# Patient Record
Sex: MALE | Race: WHITE | NOT HISPANIC OR LATINO
[De-identification: names, ages, dates, MRNs, and addresses within clinical notes are randomized per-mention and may not be internally consistent; named-entity substitution may affect disease eponyms.]

---

## 2017-10-23 ENCOUNTER — RECORDS - HEALTHEAST (OUTPATIENT)
Dept: ADMINISTRATIVE | Facility: OTHER | Age: 72
End: 2017-10-23

## 2018-09-25 ENCOUNTER — OFFICE VISIT - HEALTHEAST (OUTPATIENT)
Dept: FAMILY MEDICINE | Facility: CLINIC | Age: 73
End: 2018-09-25

## 2018-09-25 DIAGNOSIS — G89.29 CHRONIC LEFT SHOULDER PAIN: ICD-10-CM

## 2018-09-25 DIAGNOSIS — D47.3 ESSENTIAL THROMBOCYTHEMIA (H): ICD-10-CM

## 2018-09-25 DIAGNOSIS — N40.0 BPH (BENIGN PROSTATIC HYPERPLASIA): ICD-10-CM

## 2018-09-25 DIAGNOSIS — I10 ESSENTIAL HYPERTENSION: ICD-10-CM

## 2018-09-25 DIAGNOSIS — M25.512 CHRONIC LEFT SHOULDER PAIN: ICD-10-CM

## 2018-09-25 DIAGNOSIS — Z23 NEED FOR VACCINATION: ICD-10-CM

## 2018-09-25 DIAGNOSIS — Z12.11 SCREEN FOR COLON CANCER: ICD-10-CM

## 2018-09-25 DIAGNOSIS — E78.00 ELEVATED CHOLESTEROL: ICD-10-CM

## 2018-09-25 LAB
BASOPHILS # BLD AUTO: 0 THOU/UL (ref 0–0.2)
BASOPHILS NFR BLD AUTO: 0 % (ref 0–2)
CHOLEST SERPL-MCNC: 210 MG/DL
EOSINOPHIL # BLD AUTO: 0.1 THOU/UL (ref 0–0.4)
EOSINOPHIL NFR BLD AUTO: 2 % (ref 0–6)
ERYTHROCYTE [DISTWIDTH] IN BLOOD BY AUTOMATED COUNT: 11.2 % (ref 11–14.5)
FASTING STATUS PATIENT QL REPORTED: NO
HCT VFR BLD AUTO: 37.4 % (ref 40–54)
HDLC SERPL-MCNC: 45 MG/DL
HGB BLD-MCNC: 13.1 G/DL (ref 14–18)
LDLC SERPL CALC-MCNC: 127 MG/DL
LYMPHOCYTES # BLD AUTO: 1.4 THOU/UL (ref 0.8–4.4)
LYMPHOCYTES NFR BLD AUTO: 23 % (ref 20–40)
MCH RBC QN AUTO: 40.7 PG (ref 27–34)
MCHC RBC AUTO-ENTMCNC: 35 G/DL (ref 32–36)
MCV RBC AUTO: 116 FL (ref 80–100)
MONOCYTES # BLD AUTO: 0.4 THOU/UL (ref 0–0.9)
MONOCYTES NFR BLD AUTO: 7 % (ref 2–10)
NEUTROPHILS # BLD AUTO: 4.1 THOU/UL (ref 2–7.7)
NEUTROPHILS NFR BLD AUTO: 67 % (ref 50–70)
PLATELET # BLD AUTO: 509 THOU/UL (ref 140–440)
PMV BLD AUTO: 6.4 FL (ref 7–10)
RBC # BLD AUTO: 3.21 MILL/UL (ref 4.4–6.2)
TRIGL SERPL-MCNC: 188 MG/DL
WBC: 6.1 THOU/UL (ref 4–11)

## 2018-09-25 RX ORDER — HYDROXYUREA 500 MG/1
CAPSULE ORAL
Status: SHIPPED | COMMUNITY
Start: 2018-09-17

## 2018-09-25 ASSESSMENT — MIFFLIN-ST. JEOR: SCORE: 1506.57

## 2018-10-02 ENCOUNTER — COMMUNICATION - HEALTHEAST (OUTPATIENT)
Dept: FAMILY MEDICINE | Facility: CLINIC | Age: 73
End: 2018-10-02

## 2018-10-30 ENCOUNTER — OFFICE VISIT - HEALTHEAST (OUTPATIENT)
Dept: FAMILY MEDICINE | Facility: CLINIC | Age: 73
End: 2018-10-30

## 2018-10-30 DIAGNOSIS — I10 BENIGN ESSENTIAL HYPERTENSION: ICD-10-CM

## 2018-10-30 DIAGNOSIS — N40.0 BPH (BENIGN PROSTATIC HYPERPLASIA): ICD-10-CM

## 2018-10-30 RX ORDER — TAMSULOSIN HYDROCHLORIDE 0.4 MG/1
0.4 CAPSULE ORAL
Qty: 90 CAPSULE | Refills: 12 | Status: SHIPPED | OUTPATIENT
Start: 2018-10-30

## 2018-10-30 ASSESSMENT — MIFFLIN-ST. JEOR: SCORE: 1505.92

## 2018-12-19 ENCOUNTER — AMBULATORY - HEALTHEAST (OUTPATIENT)
Dept: NURSING | Facility: CLINIC | Age: 73
End: 2018-12-19

## 2021-06-01 VITALS — BODY MASS INDEX: 25.03 KG/M2 | HEIGHT: 69 IN | WEIGHT: 169 LBS

## 2021-06-02 VITALS — WEIGHT: 169 LBS | BODY MASS INDEX: 25.03 KG/M2 | HEIGHT: 69 IN

## 2021-06-16 PROBLEM — N40.0 BPH (BENIGN PROSTATIC HYPERPLASIA): Status: ACTIVE | Noted: 2018-09-25

## 2021-06-16 PROBLEM — M25.512 CHRONIC LEFT SHOULDER PAIN: Status: ACTIVE | Noted: 2018-09-25

## 2021-06-16 PROBLEM — G89.29 CHRONIC LEFT SHOULDER PAIN: Status: ACTIVE | Noted: 2018-09-25

## 2021-06-20 NOTE — PROGRESS NOTES
MALE PREVENTATIVE EXAM    Assessment and Plan:       1. Screen for colon cancer  Already done.  He had a colonoscopy in 2016.  He was found to have adenomatous polyps, and needs repeat colonoscopy in 5 years.      2. BPH (benign prostatic hyperplasia)  We will consider medications for this if his symptoms persist.  I did let him know that medications for this could also potentially help with his blood pressure.  Will reevaluate when he comes back in 2 weeks for a blood pressure check.    3. Chronic left shoulder pain  Stretches and exercises were given today.  I did review with him that if his functioning is severely limited that I would want him to undergo an MRI as well as have second opinion regarding treatment plan.    4. Elevated cholesterol  Encouraged ongoing regular exercise.  Consider statin if ldl is increasing.    - Lipid Cascade    5. Essential thrombocythemia (H)  On hydroxyurea.  Follows up with heme yearly.    His last cbc was normal 1 year ago, only with some mild anemia.  Platelets were normal .   - HM1(CBC and Differential)  - HM1 (CBC with Diff)    6. Need for vaccination    - Pneumococcal conjugate vaccine 13-valent 6wks-17yrs; >50yrs     7.  Essential hypertension:    I did advise him to cut caffeine out to see how he does with this.  At his next visit we can consider starting a medication that will benefit both his blood pressure as well as his BPH.  This was discussed with the patient today.  He will follow-up in 2 weeks for recheck of his blood pressure.  I have asked him to bring his blood pressure cuff at that time so we can check it against ours.    He will check on the shingrix vaccine.      Next follow up:  No Follow-up on file.    Immunization Review  Adult Imm Review: Missing doses of shingrix, influenza, pneumonia.      I discussed the following with the patient:   Adult Healthy Living: Importance of regular exercise  Healthy nutrition    I have had an Advance Directives discussion  with the patient.  He does have an advance directive.      Subjective:   Chief Complaint: Savage Wright is an 73 y.o. male here for a preventative health visit.     HPI:  72 yo male who is here to establish care and for a physical exam.      Pmh:   Hypertension: He has been having slowly increasing blood pressures over the past several years.  Lately when he has been checking at home is been 150s over 90s.  He denies any headaches, vision changes or any other symptoms from this.  No chest pain.  He has never been on medication for hypertension in the past.  He does drink several cups of caffeinated coffee through the day.  Otherwise he has tried to change his lifestyle to decrease blood pressure.  Bph:  He has had a psa done in the past, but does not want one today.  Last prostate exam was less than 1 year ago.  He does have increasing nocturia.  He is starting to have occasional difficulty with emptying his bladder.  This is not consistent.  He has started to limit the amount of water that he drinks close to the time that is time for him to go to bed so that he does not have to get up more than 3 times a night.  Elevated cholesterol.  He is not on any medication for this.  In the past he had an elevated LDL.  His triglycerides have been normal.  Left shoulder pain.  He does recall an ongoing pain and injury many years ago.  He did see an orthopedist and was told that he has a bone spur based on an x-ray.  At the time he was recommended to have a complete shoulder replacement.  He declined.  Since that time he has been working on range of motion exercises and has actually gotten increased range of motion.  He denies any significant weakness on that side now.  No sensation changes.  He is right-handed.    Healthy Habits  Are you taking a daily aspirin? No  Do you typically exercising at least 40 min, 3-4 times per week?  Yes  Do you usually eat at least 4 servings of fruit and vegetables a day, include whole  "grains and fiber and avoid regularly eating high fat foods? Yes  Have you had an eye exam in the past two years? Yes  Do you see a dentist twice per year? Yes  Do you have any concerns regarding sleep? No    Safety Screen    Do you feel you are safe where you are living?: Yes (9/25/2018 12:21 PM)  Do you feel you are safe in your relationship(s)?: Yes (9/25/2018 12:21 PM)    Review of Systems:  Please see above.  The rest of the review of systems are negative for all systems.     Cancer Screening       Status Date      COLONOSCOPY Postponed 10/25/2018 Originally 7/28/1997. Patient Declined              History     Reviewed By Date/Time Sections Reviewed    Mark Diaz LPN 9/25/2018 12:22 PM Tobacco, Alcohol, Drug Use, Sexual Activity            Objective:   Vital Signs:   Visit Vitals     BP (!) 150/94     Pulse 84     Temp 98  F (36.7  C) (Oral)     Resp 12     Ht 5' 9.29\" (1.76 m)     Wt 169 lb (76.7 kg)     SpO2 97%     BMI 24.75 kg/m2          PHYSICAL EXAM  General Appearance: Alert, cooperative, no distress, appears stated age  Head: Normocephalic, without obvious abnormality, atraumatic  Eyes: PERRL, conjunctiva/corneas clear, EOM's intact  Ears: Normal TM's and external ear canals, both ears  Nose: Nares normal, septum midline,mucosa normal, no drainage  Throat: Lips, mucosa, and tongue normal; teeth and gums normal  Neck: Supple, symmetrical, trachea midline, no adenopathy;  thyroid: not enlarged, symmetric, no tenderness/mass/nodules; no carotid bruit or JVD  Back: Symmetric, no curvature, ROM normal, no CVA tenderness  Lungs: Clear to auscultation bilaterally, respirations unlabored  Breasts: No breast masses, tenderness, asymmetry, or nipple discharge.  Heart: Regular rate and rhythm, S1 and S2 normal, no murmur, rub, or gallop,   Abdomen: Soft, non-tender, bowel sounds active all four quadrants,  no masses, no organomegaly, obese  Extremities: Extremities normal, atraumatic, no cyanosis or edema.  " He has decreased range of motion in his left shoulder.  He can abduct to 90 degrees in all directions and flex to 90 and extend to 90.  Strength is nearly symmetric bilaterally.  Sensation is intact.  Skin: There is a small lesion over his left ear.  This is flaky with some mild erythema.  He is wondering if he cut himself there a week ago.  He will keep an eye on this.    Lymph nodes: Cervical, supraclavicular, and axillary nodes normal  Neurologic: Normal   He declines a gu exam today.                Additional Screenings Completed Today:

## 2021-06-21 NOTE — PROGRESS NOTES
"S:  73-year-old male who comes in today for follow-up of his blood pressure.  He is doing well.  Since he was here last he has cut out caffeine and has been increasing his exercise.  He says that he is feeling good.  He denies any headache, shortness of breath, chest pain, numbness, tingling, weakness in any part of his body.  He continues to have nocturia that is bothering him.  He has previously been diagnosed with BPH.    He has been using his blood pressure cuff machine at home.  He brings in the readings from that.  The majority are over 130.  Some of them are as high as 170.  When he checked his machine against ours here his was very inconsistent in its readings and was often inaccurate.  O:  /84  Pulse 74  Temp 98.4  F (36.9  C) (Oral)   Resp 16  Ht 5' 9.25\" (1.759 m)  Wt 169 lb (76.7 kg)  SpO2 97%  BMI 24.78 kg/m2  Gen:  Nad, alert    Patient Active Problem List   Diagnosis     BPH (benign prostatic hyperplasia)     Chronic left shoulder pain     Adenomatous polyp of colon     Essential thrombocytosis (H)     Current Outpatient Prescriptions on File Prior to Visit   Medication Sig Dispense Refill     hydroxyurea (HYDREA) 500 mg capsule        No current facility-administered medications on file prior to visit.           No results found for this or any previous visit (from the past 48 hour(s)).      Assessment/Plan:  1. BPH (benign prostatic hyperplasia)  Start tamsulosin.  We will not start blood pressure medication at this time until we see how the tamsulosin affects his blood pressure.  I did warn him about orthostatic hypotension and some dizziness when he starts this.  I did let him know that if this helps him a little bit but he needs an increased dose that it would be just fine to go 2.8 mg.  - tamsulosin (FLOMAX) 0.4 mg cap; Take 1 capsule (0.4 mg total) by mouth Daily after breakfast.  Dispense: 90 capsule; Refill: 12    2. Benign essential hypertension  Recheck blood pressure in 2 " weeks when he comes back for a nurse only for shingles vaccine.  Otherwise recheck in 1 month.  I did advise him to get a new blood pressure cuff.          Rachele Mendoza   10/30/2018 2:18 PM